# Patient Record
Sex: MALE | Race: WHITE | NOT HISPANIC OR LATINO | Employment: FULL TIME | URBAN - METROPOLITAN AREA
[De-identification: names, ages, dates, MRNs, and addresses within clinical notes are randomized per-mention and may not be internally consistent; named-entity substitution may affect disease eponyms.]

---

## 2022-05-30 ENCOUNTER — OFFICE VISIT (OUTPATIENT)
Dept: URGENT CARE | Facility: CLINIC | Age: 32
End: 2022-05-30
Payer: COMMERCIAL

## 2022-05-30 VITALS
WEIGHT: 200 LBS | SYSTOLIC BLOOD PRESSURE: 120 MMHG | RESPIRATION RATE: 18 BRPM | OXYGEN SATURATION: 98 % | DIASTOLIC BLOOD PRESSURE: 75 MMHG | HEART RATE: 75 BPM | HEIGHT: 66 IN | BODY MASS INDEX: 32.14 KG/M2 | TEMPERATURE: 98 F

## 2022-05-30 DIAGNOSIS — R05.9 COUGH: ICD-10-CM

## 2022-05-30 DIAGNOSIS — J06.9 VIRAL URI: ICD-10-CM

## 2022-05-30 DIAGNOSIS — H10.9 BACTERIAL CONJUNCTIVITIS OF BOTH EYES: Primary | ICD-10-CM

## 2022-05-30 DIAGNOSIS — J30.2 SEASONAL ALLERGIES: ICD-10-CM

## 2022-05-30 DIAGNOSIS — B96.89 BACTERIAL CONJUNCTIVITIS OF BOTH EYES: Primary | ICD-10-CM

## 2022-05-30 LAB
CTP QC/QA: YES
SARS-COV-2 RDRP RESP QL NAA+PROBE: NEGATIVE

## 2022-05-30 PROCEDURE — 3074F PR MOST RECENT SYSTOLIC BLOOD PRESSURE < 130 MM HG: ICD-10-PCS | Mod: CPTII,S$GLB,, | Performed by: NURSE PRACTITIONER

## 2022-05-30 PROCEDURE — 1160F RVW MEDS BY RX/DR IN RCRD: CPT | Mod: CPTII,S$GLB,, | Performed by: NURSE PRACTITIONER

## 2022-05-30 PROCEDURE — 3008F PR BODY MASS INDEX (BMI) DOCUMENTED: ICD-10-PCS | Mod: CPTII,S$GLB,, | Performed by: NURSE PRACTITIONER

## 2022-05-30 PROCEDURE — 1160F PR REVIEW ALL MEDS BY PRESCRIBER/CLIN PHARMACIST DOCUMENTED: ICD-10-PCS | Mod: CPTII,S$GLB,, | Performed by: NURSE PRACTITIONER

## 2022-05-30 PROCEDURE — U0002 COVID-19 LAB TEST NON-CDC: HCPCS | Mod: QW,S$GLB,, | Performed by: NURSE PRACTITIONER

## 2022-05-30 PROCEDURE — 1159F PR MEDICATION LIST DOCUMENTED IN MEDICAL RECORD: ICD-10-PCS | Mod: CPTII,S$GLB,, | Performed by: NURSE PRACTITIONER

## 2022-05-30 PROCEDURE — 99203 PR OFFICE/OUTPT VISIT, NEW, LEVL III, 30-44 MIN: ICD-10-PCS | Mod: S$GLB,,, | Performed by: NURSE PRACTITIONER

## 2022-05-30 PROCEDURE — 3078F DIAST BP <80 MM HG: CPT | Mod: CPTII,S$GLB,, | Performed by: NURSE PRACTITIONER

## 2022-05-30 PROCEDURE — 3008F BODY MASS INDEX DOCD: CPT | Mod: CPTII,S$GLB,, | Performed by: NURSE PRACTITIONER

## 2022-05-30 PROCEDURE — U0002: ICD-10-PCS | Mod: QW,S$GLB,, | Performed by: NURSE PRACTITIONER

## 2022-05-30 PROCEDURE — 3074F SYST BP LT 130 MM HG: CPT | Mod: CPTII,S$GLB,, | Performed by: NURSE PRACTITIONER

## 2022-05-30 PROCEDURE — 1159F MED LIST DOCD IN RCRD: CPT | Mod: CPTII,S$GLB,, | Performed by: NURSE PRACTITIONER

## 2022-05-30 PROCEDURE — 99203 OFFICE O/P NEW LOW 30 MIN: CPT | Mod: S$GLB,,, | Performed by: NURSE PRACTITIONER

## 2022-05-30 PROCEDURE — 3078F PR MOST RECENT DIASTOLIC BLOOD PRESSURE < 80 MM HG: ICD-10-PCS | Mod: CPTII,S$GLB,, | Performed by: NURSE PRACTITIONER

## 2022-05-30 RX ORDER — AZELASTINE 1 MG/ML
1 SPRAY, METERED NASAL 2 TIMES DAILY PRN
Qty: 30 ML | Refills: 0 | Status: SHIPPED | OUTPATIENT
Start: 2022-05-30

## 2022-05-30 RX ORDER — OFLOXACIN 3 MG/ML
2 SOLUTION/ DROPS OPHTHALMIC 4 TIMES DAILY
Qty: 5 ML | Refills: 0 | Status: SHIPPED | OUTPATIENT
Start: 2022-05-30 | End: 2022-06-06

## 2022-05-30 NOTE — PROGRESS NOTES
"Subjective:       Patient ID: Gualberto Kuhn Jr. is a 31 y.o. male.    Vitals:  height is 5' 6" (1.676 m) and weight is 90.7 kg (200 lb). His temperature is 97.7 °F (36.5 °C). His blood pressure is 120/75 and his pulse is 75. His respiration is 18 and oxygen saturation is 98%.     Chief Complaint: Facial Swelling    Pt is coming in with congestion and swollen eyes. Pt says her have been having the congestion for the pass few days and the eye swelling started last night. Pt says he suffers with seasonal allergies. Pt says his eye normally get red but this time they got puffy.Pt says his left eye is more red and blurry than the right.  Pt says he also have a slight cough only when he feels the post nasal drip. Pt did take any medication to help.     Provider note begins below:  31-year-old male is here today with complaints of nasal congestion/PND/pressure and cough for the past 3 days.  Patient also reports bilateral eye irritation, redness, blurry from tearing and yellow discharge that started yesterday.  Patient denies fever, chills, chest pain, shortness of breath, vomiting, diarrhea, abdominal pain, eye ball pain, eye injury, FB sensation or vision loss/changes.  Patient reports a history of seasonal allergies.  No OTC medications have been taking so far. Afebrile.  Patient is not COVID vaccinated.    Sinus Problem  This is a new problem. The current episode started in the past 7 days. There has been no fever. His pain is at a severity of 0/10. He is experiencing no pain. Associated symptoms include congestion, coughing and sinus pressure. Pertinent negatives include no chills, ear pain, headaches, neck pain, shortness of breath, sneezing or sore throat. Past treatments include nothing. The treatment provided no relief.       Constitution: Negative. Negative for chills, fatigue and fever.   HENT: Positive for congestion, postnasal drip and sinus pressure. Negative for ear pain, ear discharge, facial swelling and " sore throat.    Neck: Negative for neck pain, neck stiffness and painful lymph nodes.   Cardiovascular: Negative.  Negative for chest pain and sob on exertion.   Eyes: Negative.  Positive for eyelid swelling. Negative for eye itching, eye pain and eye redness.   Respiratory: Positive for cough. Negative for chest tightness, shortness of breath, wheezing and asthma.    Gastrointestinal: Negative.  Negative for abdominal pain, nausea and vomiting.   Endocrine: negative. excessive thirst.   Genitourinary: Negative.  Negative for dysuria, frequency, urgency and flank pain.   Musculoskeletal: Negative.  Negative for pain, trauma, joint pain and joint swelling.   Skin: Negative.  Negative for rash, wound, lesion and hives.   Allergic/Immunologic: Negative.  Positive for seasonal allergies. Negative for eczema, asthma, hives, itching and sneezing.   Neurological: Negative.  Negative for dizziness, passing out, headaches, disorientation and altered mental status.   Hematologic/Lymphatic: Negative.  Negative for swollen lymph nodes.   Psychiatric/Behavioral: Negative.  Negative for altered mental status, disorientation and confusion.       Objective:      Physical Exam   Constitutional: He is oriented to person, place, and time. He appears well-developed. He is cooperative.  Non-toxic appearance. He does not appear ill. No distress.   HENT:   Head: Normocephalic and atraumatic.   Ears:   Right Ear: Hearing, tympanic membrane, external ear and ear canal normal. impacted cerumen  Left Ear: Hearing, tympanic membrane, external ear and ear canal normal. impacted cerumen  Nose: Rhinorrhea and congestion present. No mucosal edema or nasal deformity. No epistaxis. Right sinus exhibits no maxillary sinus tenderness and no frontal sinus tenderness. Left sinus exhibits no maxillary sinus tenderness and no frontal sinus tenderness.   Mouth/Throat: Uvula is midline, oropharynx is clear and moist and mucous membranes are normal. No  trismus in the jaw. Normal dentition. No uvula swelling. No oropharyngeal exudate, posterior oropharyngeal edema or posterior oropharyngeal erythema.   Eyes: Lids are normal. Lids are everted and swept, no foreign bodies found. Right eye exhibits discharge. Right eye exhibits no chemosis, no exudate and no hordeolum. No foreign body present in the right eye. Left eye exhibits discharge. Left eye exhibits no chemosis, no exudate and no hordeolum. No foreign body present in the left eye. Right conjunctiva is not injected. Right conjunctiva has no hemorrhage. Left conjunctiva is injected. Left conjunctiva has no hemorrhage. No scleral icterus. Right eye exhibits normal extraocular motion and no nystagmus. Left eye exhibits normal extraocular motion and no nystagmus. vision grossly intact gaze aligned appropriately   Neck: Trachea normal and phonation normal. Neck supple. No edema present. No erythema present. No neck rigidity present.   Cardiovascular: Normal rate, regular rhythm, normal heart sounds and normal pulses.   Pulmonary/Chest: Effort normal and breath sounds normal. No stridor. No respiratory distress. He has no decreased breath sounds. He has no wheezes. He has no rhonchi. He has no rales. He exhibits no tenderness.   Abdominal: Normal appearance. Soft. flat abdomen There is no abdominal tenderness. There is no left CVA tenderness and no right CVA tenderness.   Musculoskeletal: Normal range of motion.         General: No deformity. Normal range of motion.   Lymphadenopathy:     He has no cervical adenopathy.   Neurological: no focal deficit. He is alert and oriented to person, place, and time. He exhibits normal muscle tone. Coordination normal.   Skin: Skin is warm, dry, intact, not diaphoretic and not pale.   Psychiatric: His speech is normal and behavior is normal. Mood, judgment and thought content normal.   Nursing note and vitals reviewed.    The following results have been reviewed with the  patient:  LABS-  Results for orders placed or performed in visit on 05/30/22   POCT COVID-19 Rapid Screening   Result Value Ref Range    POC Rapid COVID Negative Negative     Acceptable Yes        Hearing Screening    125Hz 250Hz 500Hz 1000Hz 2000Hz 3000Hz 4000Hz 6000Hz 8000Hz   Right ear:            Left ear:               Visual Acuity Screening    Right eye Left eye Both eyes   Without correction: 20/13 20/20 20/13   With correction:        IMAGING-  No results found.      Assessment:       1. Bacterial conjunctivitis of both eyes    2. Cough    3. Viral URI    4. Seasonal allergies          Plan:       FOLLOWUP  Follow up if symptoms worsen or fail to improve, for PLEASE CONTACT PCP OR CONTACT THE EMERGENCY ROOM..     PATIENT INSTRUCTIONS  Patient Instructions   INSTRUCTIONS:  - Rest.  - Drink plenty of fluids.  - Take Tylenol and/or Ibuprofen as directed as needed for fever/pain.  Do not take more than the recommended dose.  - follow up with your PCP within the next 1-2 weeks as needed.  - You must understand that you have received an Urgent Care treatment only and that you may be released before all of your medical problems are known or treated.   - You, the patient, will arrange for follow up care as instructed.   - If your condition worsens or fails to improve we recommend that you receive another evaluation at the ER immediately or contact your PCP to discuss your concerns.   - You can call [phone] or [phone] to help schedule an appointment with the appropriate provider.       OVER THE COUNTER RECOMMENDATIONS/SUGGESTIONS.     Make sure to stay well hydrated.     Use Nasal Saline to mechanically move any post nasal drip from your eustachian tube or from the back of your throat.     Use warm salt water gargles to ease your throat pain. Warm salt water gargles as needed for sore throat-  1/2 tsp salt to 1 cup warm water, gargle as desired.     Use an antihistamine such as Claritin,  Zyrtec or Allegra to dry you out.      Use pseudoephedrine (behind the counter) to decongest. Pseudoephedrine  30 mg up to 240 mg /day. It can raise your blood pressure and give you palpitations.     Use mucinex (guaifenisin) to break up mucous up to 2400mg/day to loosen any mucous.   The mucinex DM pill has a cough suppressant that can be sedating. It can be used at night to stop the tickle at the back of your throat.  You can use Mucinex D (it has guaifenesin and a high dose of pseudoephedrine) in the mornings to help decongest.        Use Afrin in each nare for no longer than 3 days, as it is addictive. It can also dry out your mucous membranes and cause elevated blood pressure. This is especially useful if you are flying.     Use Flonase 1-2 sprays/nostril per day. It is a local acting steroid nasal spray, if you develop a bloody nose, stop using the medication immediately.     Sometimes Nyquil at night is beneficial to help you get some rest, however it is sedating and it does have an antihistamine, and tylenol.     Honey is a natural cough suppressant that can be used.     Tylenol up to 4,000 mg a day is safe for short periods and can be used for body aches, pain, and fever. However in high doses and prolonged use it can cause liver irritation.     Ibuprofen is a non-steroidal anti-inflammatory that can be used for body aches, pain, and fever.However it can also cause stomach irritation if over used.           THANK YOU FOR ALLOWING ME TO PARTICIPATE IN YOUR HEALTHCARE,     Manuel Avalos NP   Bacterial conjunctivitis of both eyes  -     ofloxacin (OCUFLOX) 0.3 % ophthalmic solution; Place 2 drops into both eyes 4 (four) times daily. for 7 days  Dispense: 5 mL; Refill: 0    Cough  -     POCT COVID-19 Rapid Screening    Viral URI  -     azelastine (ASTELIN) 137 mcg (0.1 %) nasal spray; 1 spray (137 mcg total) by Nasal route 2 (two) times daily as needed for Rhinitis.  Dispense: 30 mL; Refill: 0    Seasonal  allergies

## 2022-05-30 NOTE — PATIENT INSTRUCTIONS
INSTRUCTIONS:  - Rest.  - Drink plenty of fluids.  - Take Tylenol and/or Ibuprofen as directed as needed for fever/pain.  Do not take more than the recommended dose.  - follow up with your PCP within the next 1-2 weeks as needed.  - You must understand that you have received an Urgent Care treatment only and that you may be released before all of your medical problems are known or treated.   - You, the patient, will arrange for follow up care as instructed.   - If your condition worsens or fails to improve we recommend that you receive another evaluation at the ER immediately or contact your PCP to discuss your concerns.   - You can call [phone] or [phone] to help schedule an appointment with the appropriate provider.       OVER THE COUNTER RECOMMENDATIONS/SUGGESTIONS.     Make sure to stay well hydrated.     Use Nasal Saline to mechanically move any post nasal drip from your eustachian tube or from the back of your throat.     Use warm salt water gargles to ease your throat pain. Warm salt water gargles as needed for sore throat-  1/2 tsp salt to 1 cup warm water, gargle as desired.     Use an antihistamine such as Claritin, Zyrtec or Allegra to dry you out.      Use pseudoephedrine (behind the counter) to decongest. Pseudoephedrine  30 mg up to 240 mg /day. It can raise your blood pressure and give you palpitations.     Use mucinex (guaifenisin) to break up mucous up to 2400mg/day to loosen any mucous.   The mucinex DM pill has a cough suppressant that can be sedating. It can be used at night to stop the tickle at the back of your throat.  You can use Mucinex D (it has guaifenesin and a high dose of pseudoephedrine) in the mornings to help decongest.        Use Afrin in each nare for no longer than 3 days, as it is addictive. It can also dry out your mucous membranes and cause elevated blood pressure. This is especially useful if you are flying.     Use Flonase 1-2 sprays/nostril per day. It is a  local acting steroid nasal spray, if you develop a bloody nose, stop using the medication immediately.     Sometimes Nyquil at night is beneficial to help you get some rest, however it is sedating and it does have an antihistamine, and tylenol.     Honey is a natural cough suppressant that can be used.     Tylenol up to 4,000 mg a day is safe for short periods and can be used for body aches, pain, and fever. However in high doses and prolonged use it can cause liver irritation.     Ibuprofen is a non-steroidal anti-inflammatory that can be used for body aches, pain, and fever.However it can also cause stomach irritation if over used.

## 2023-12-18 ENCOUNTER — OFFICE VISIT (OUTPATIENT)
Dept: PRIMARY CARE CLINIC | Facility: CLINIC | Age: 33
End: 2023-12-18
Payer: COMMERCIAL

## 2023-12-18 ENCOUNTER — LAB VISIT (OUTPATIENT)
Dept: LAB | Facility: HOSPITAL | Age: 33
End: 2023-12-18
Attending: INTERNAL MEDICINE
Payer: COMMERCIAL

## 2023-12-18 VITALS
BODY MASS INDEX: 36.35 KG/M2 | DIASTOLIC BLOOD PRESSURE: 88 MMHG | WEIGHT: 226.19 LBS | HEART RATE: 76 BPM | SYSTOLIC BLOOD PRESSURE: 110 MMHG | HEIGHT: 66 IN | OXYGEN SATURATION: 98 %

## 2023-12-18 DIAGNOSIS — Z00.00 ANNUAL PHYSICAL EXAM: ICD-10-CM

## 2023-12-18 DIAGNOSIS — R00.2 PALPITATIONS: ICD-10-CM

## 2023-12-18 DIAGNOSIS — Z00.00 ANNUAL PHYSICAL EXAM: Primary | ICD-10-CM

## 2023-12-18 DIAGNOSIS — Z23 NEED FOR VACCINATION: ICD-10-CM

## 2023-12-18 LAB
ALBUMIN SERPL BCP-MCNC: 4.5 G/DL (ref 3.5–5.2)
ALP SERPL-CCNC: 55 U/L (ref 55–135)
ALT SERPL W/O P-5'-P-CCNC: 46 U/L (ref 10–44)
ANION GAP SERPL CALC-SCNC: 11 MMOL/L (ref 8–16)
AST SERPL-CCNC: 28 U/L (ref 10–40)
BASOPHILS # BLD AUTO: 0.05 K/UL (ref 0–0.2)
BASOPHILS NFR BLD: 0.8 % (ref 0–1.9)
BILIRUB SERPL-MCNC: 1.3 MG/DL (ref 0.1–1)
BUN SERPL-MCNC: 16 MG/DL (ref 6–20)
CALCIUM SERPL-MCNC: 9.9 MG/DL (ref 8.7–10.5)
CHLORIDE SERPL-SCNC: 104 MMOL/L (ref 95–110)
CHOLEST SERPL-MCNC: 241 MG/DL (ref 120–199)
CHOLEST/HDLC SERPL: 5.7 {RATIO} (ref 2–5)
CO2 SERPL-SCNC: 23 MMOL/L (ref 23–29)
CREAT SERPL-MCNC: 0.7 MG/DL (ref 0.5–1.4)
DIFFERENTIAL METHOD: NORMAL
EOSINOPHIL # BLD AUTO: 0.2 K/UL (ref 0–0.5)
EOSINOPHIL NFR BLD: 2.7 % (ref 0–8)
ERYTHROCYTE [DISTWIDTH] IN BLOOD BY AUTOMATED COUNT: 12.2 % (ref 11.5–14.5)
EST. GFR  (NO RACE VARIABLE): >60 ML/MIN/1.73 M^2
GLUCOSE SERPL-MCNC: 84 MG/DL (ref 70–110)
HCT VFR BLD AUTO: 46.5 % (ref 40–54)
HDLC SERPL-MCNC: 42 MG/DL (ref 40–75)
HDLC SERPL: 17.4 % (ref 20–50)
HGB BLD-MCNC: 16.4 G/DL (ref 14–18)
IMM GRANULOCYTES # BLD AUTO: 0.03 K/UL (ref 0–0.04)
IMM GRANULOCYTES NFR BLD AUTO: 0.5 % (ref 0–0.5)
LDLC SERPL CALC-MCNC: 130.2 MG/DL (ref 63–159)
LYMPHOCYTES # BLD AUTO: 2 K/UL (ref 1–4.8)
LYMPHOCYTES NFR BLD: 31.3 % (ref 18–48)
MCH RBC QN AUTO: 29.5 PG (ref 27–31)
MCHC RBC AUTO-ENTMCNC: 35.3 G/DL (ref 32–36)
MCV RBC AUTO: 84 FL (ref 82–98)
MONOCYTES # BLD AUTO: 0.7 K/UL (ref 0.3–1)
MONOCYTES NFR BLD: 10.8 % (ref 4–15)
NEUTROPHILS # BLD AUTO: 3.5 K/UL (ref 1.8–7.7)
NEUTROPHILS NFR BLD: 53.9 % (ref 38–73)
NONHDLC SERPL-MCNC: 199 MG/DL
NRBC BLD-RTO: 0 /100 WBC
PLATELET # BLD AUTO: 207 K/UL (ref 150–450)
PMV BLD AUTO: 11.1 FL (ref 9.2–12.9)
POTASSIUM SERPL-SCNC: 4.5 MMOL/L (ref 3.5–5.1)
PROT SERPL-MCNC: 8 G/DL (ref 6–8.4)
RBC # BLD AUTO: 5.56 M/UL (ref 4.6–6.2)
SODIUM SERPL-SCNC: 138 MMOL/L (ref 136–145)
TRIGL SERPL-MCNC: 344 MG/DL (ref 30–150)
TSH SERPL DL<=0.005 MIU/L-ACNC: 1.57 UIU/ML (ref 0.4–4)
WBC # BLD AUTO: 6.39 K/UL (ref 3.9–12.7)

## 2023-12-18 PROCEDURE — 84443 ASSAY THYROID STIM HORMONE: CPT | Performed by: INTERNAL MEDICINE

## 2023-12-18 PROCEDURE — 1159F MED LIST DOCD IN RCRD: CPT | Mod: CPTII,S$GLB,, | Performed by: INTERNAL MEDICINE

## 2023-12-18 PROCEDURE — 1159F PR MEDICATION LIST DOCUMENTED IN MEDICAL RECORD: ICD-10-PCS | Mod: CPTII,S$GLB,, | Performed by: INTERNAL MEDICINE

## 2023-12-18 PROCEDURE — 99385 PR PREVENTIVE VISIT,NEW,18-39: ICD-10-PCS | Mod: 25,S$GLB,, | Performed by: INTERNAL MEDICINE

## 2023-12-18 PROCEDURE — 90715 TDAP VACCINE 7 YRS/> IM: CPT | Mod: S$GLB,,, | Performed by: INTERNAL MEDICINE

## 2023-12-18 PROCEDURE — 99385 PREV VISIT NEW AGE 18-39: CPT | Mod: 25,S$GLB,, | Performed by: INTERNAL MEDICINE

## 2023-12-18 PROCEDURE — 3008F PR BODY MASS INDEX (BMI) DOCUMENTED: ICD-10-PCS | Mod: CPTII,S$GLB,, | Performed by: INTERNAL MEDICINE

## 2023-12-18 PROCEDURE — 3008F BODY MASS INDEX DOCD: CPT | Mod: CPTII,S$GLB,, | Performed by: INTERNAL MEDICINE

## 2023-12-18 PROCEDURE — 99999 PR PBB SHADOW E&M-EST. PATIENT-LVL III: ICD-10-PCS | Mod: PBBFAC,,, | Performed by: INTERNAL MEDICINE

## 2023-12-18 PROCEDURE — 80061 LIPID PANEL: CPT | Performed by: INTERNAL MEDICINE

## 2023-12-18 PROCEDURE — 90715 TDAP VACCINE GREATER THAN OR EQUAL TO 7YO IM: ICD-10-PCS | Mod: S$GLB,,, | Performed by: INTERNAL MEDICINE

## 2023-12-18 PROCEDURE — 85025 COMPLETE CBC W/AUTO DIFF WBC: CPT | Performed by: INTERNAL MEDICINE

## 2023-12-18 PROCEDURE — 36415 COLL VENOUS BLD VENIPUNCTURE: CPT | Performed by: INTERNAL MEDICINE

## 2023-12-18 PROCEDURE — 99999 PR PBB SHADOW E&M-EST. PATIENT-LVL III: CPT | Mod: PBBFAC,,, | Performed by: INTERNAL MEDICINE

## 2023-12-18 PROCEDURE — 3074F PR MOST RECENT SYSTOLIC BLOOD PRESSURE < 130 MM HG: ICD-10-PCS | Mod: CPTII,S$GLB,, | Performed by: INTERNAL MEDICINE

## 2023-12-18 PROCEDURE — 90471 TDAP VACCINE GREATER THAN OR EQUAL TO 7YO IM: ICD-10-PCS | Mod: S$GLB,,, | Performed by: INTERNAL MEDICINE

## 2023-12-18 PROCEDURE — 80053 COMPREHEN METABOLIC PANEL: CPT | Performed by: INTERNAL MEDICINE

## 2023-12-18 PROCEDURE — 3079F DIAST BP 80-89 MM HG: CPT | Mod: CPTII,S$GLB,, | Performed by: INTERNAL MEDICINE

## 2023-12-18 PROCEDURE — 90471 IMMUNIZATION ADMIN: CPT | Mod: S$GLB,,, | Performed by: INTERNAL MEDICINE

## 2023-12-18 PROCEDURE — 3074F SYST BP LT 130 MM HG: CPT | Mod: CPTII,S$GLB,, | Performed by: INTERNAL MEDICINE

## 2023-12-18 PROCEDURE — 3079F PR MOST RECENT DIASTOLIC BLOOD PRESSURE 80-89 MM HG: ICD-10-PCS | Mod: CPTII,S$GLB,, | Performed by: INTERNAL MEDICINE

## 2023-12-18 NOTE — PROGRESS NOTES
[facility] Note    Chief Complaint      Chief Complaint   Patient presents with    Establish Care       History of Present Illness      Gualberto Kuhn Jr. is a 32 y.o. male with chronic conditions of allergic rhinitis who presents today for: establish care and annual preventative visit.  Has been having palpitations.    Diet: Prepares own food occasionally.  Orders out a lot.  Drinks plenty water.  Exercise: Stays active during work day, walking and lifting heavy objects.  Hunts on the weekends.   Denies drinking and driving, drinking more than 4 drinks on occasion, drug use.     Flu shot discussed.  Td due.    Cscope and PSA due age 46.    Past Medical History:  History reviewed. No pertinent past medical history.    Past Surgical History:   has a past surgical history that includes Tonsillectomy and Knee arthroscopy.    Family History:  family history includes Hyperlipidemia in his father; No Known Problems in his mother.     Social History:  Social History     Tobacco Use    Smoking status: Never    Smokeless tobacco: Current   Substance Use Topics    Alcohol use: Yes       I personally reviewed all past medical, surgical, social and family history.    Review of Systems   Constitutional:  Negative for chills, fever and malaise/fatigue.   Respiratory:  Negative for shortness of breath.    Cardiovascular:  Negative for chest pain.   Gastrointestinal:  Negative for constipation, diarrhea, nausea and vomiting.   Skin:  Negative for rash.   Neurological:  Negative for weakness.   All other systems reviewed and are negative.       Medications:  Outpatient Encounter Medications as of 12/18/2023   Medication Sig Dispense Refill    azelastine (ASTELIN) 137 mcg (0.1 %) nasal spray 1 spray (137 mcg total) by Nasal route 2 (two) times daily as needed for Rhinitis. (Patient not taking: Reported on 12/18/2023) 30 mL 0     No facility-administered encounter medications on file as of 12/18/2023.  "      Allergies:  Review of patient's allergies indicates:  No Known Allergies    Health Maintenance:  Immunization History   Administered Date(s) Administered    DTaP 02/27/1992, 05/08/1992, 07/03/1992, 07/02/1993, 01/07/1997    HIB 02/27/1992, 05/08/1992, 07/03/1992, 07/02/1993    Hepatitis A, Pediatric/Adolescent, 2 Dose 03/06/2006, 09/06/2006    Hepatitis B 08/02/1995, 09/06/1995, 08/03/1996    IPV 02/27/1992, 05/08/1992, 07/02/1993, 01/07/1997    MMR 07/09/1993, 01/07/1997    Meningococcal Conjugate (MCV4P) 08/28/2007, 06/13/2011    Td (ADULT) 05/11/2004      Health Maintenance   Topic Date Due    Hepatitis C Screening  Never done    TETANUS VACCINE  05/11/2014    Lipid Panel  Completed        Physical Exam      Vital Signs  Pulse: 76  SpO2: 98 %  BP: 110/88  BP Location: Left arm  Patient Position: Sitting  Pain Score: 0-No pain  Height and Weight  Height: 5' 6" (167.6 cm)  Weight: 102.6 kg (226 lb 3.1 oz)  BSA (Calculated - sq m): 2.19 sq meters  BMI (Calculated): 36.5  Weight in (lb) to have BMI = 25: 154.6]    Physical Exam  Vitals reviewed.   Constitutional:       Appearance: He is well-developed.   HENT:      Head: Normocephalic and atraumatic.      Right Ear: External ear normal.      Left Ear: External ear normal.   Cardiovascular:      Rate and Rhythm: Normal rate and regular rhythm.      Heart sounds: Normal heart sounds. No murmur heard.  Pulmonary:      Effort: Pulmonary effort is normal.      Breath sounds: Normal breath sounds. No wheezing or rales.   Abdominal:      General: Bowel sounds are normal.      Palpations: Abdomen is soft.          Laboratory:  CBC:      CMP:        Invalid input(s): "CREATININ"  URINALYSIS:       LIPIDS:      TSH:      A1C:        Assessment/Plan     Gualberto Kuhn Jr. is a 32 y.o.male with:    1. Annual physical exam  - CBC Auto Differential; Future  - Comprehensive Metabolic Panel; Future  - Lipid Panel; Future  - TSH; Future  Discussed diet and exercise, vaccines and " cancer screening, risk factors.  Screening labs ordered.     2. Palpitations  - EKG 12-lead  EKG with normal rhythm, normal intervals, no ST segment changes or TWI.  One premature ventricular beat observed.  Counseled on reducing caffeine and stress levels.  3. Need for vaccination  - Tdap Vaccine       Chronic conditions status updated as per HPI.  Other than changes above, cont current medications and maintain follow up with specialists.  No follow-ups on file.    No future appointments.    Tylor Nogueira MD  [facility]

## 2024-10-02 ENCOUNTER — PATIENT MESSAGE (OUTPATIENT)
Dept: UROLOGY | Facility: CLINIC | Age: 34
End: 2024-10-02

## 2024-10-02 ENCOUNTER — LAB VISIT (OUTPATIENT)
Dept: LAB | Facility: HOSPITAL | Age: 34
End: 2024-10-02
Attending: UROLOGY
Payer: COMMERCIAL

## 2024-10-02 ENCOUNTER — TELEPHONE (OUTPATIENT)
Dept: UROLOGY | Facility: CLINIC | Age: 34
End: 2024-10-02

## 2024-10-02 ENCOUNTER — OFFICE VISIT (OUTPATIENT)
Dept: UROLOGY | Facility: CLINIC | Age: 34
End: 2024-10-02
Payer: COMMERCIAL

## 2024-10-02 VITALS
DIASTOLIC BLOOD PRESSURE: 84 MMHG | WEIGHT: 226.44 LBS | BODY MASS INDEX: 36.39 KG/M2 | SYSTOLIC BLOOD PRESSURE: 134 MMHG | HEART RATE: 75 BPM | HEIGHT: 66 IN

## 2024-10-02 DIAGNOSIS — E29.1 TESTICULAR FAILURE: Primary | ICD-10-CM

## 2024-10-02 DIAGNOSIS — E29.1 TESTICULAR FAILURE: ICD-10-CM

## 2024-10-02 LAB
ESTRADIOL SERPL-MCNC: 26 PG/ML (ref 11–44)
FSH SERPL-ACNC: 4.92 MIU/ML (ref 0.95–11.95)
LH SERPL-ACNC: 2.8 MIU/ML (ref 0.6–12.1)
PROLACTIN SERPL IA-MCNC: 10 NG/ML (ref 3.5–19.4)
TESTOST SERPL-MCNC: 243 NG/DL (ref 304–1227)

## 2024-10-02 PROCEDURE — 1159F MED LIST DOCD IN RCRD: CPT | Mod: CPTII,S$GLB,, | Performed by: UROLOGY

## 2024-10-02 PROCEDURE — 84146 ASSAY OF PROLACTIN: CPT | Performed by: UROLOGY

## 2024-10-02 PROCEDURE — 88230 TISSUE CULTURE LYMPHOCYTE: CPT | Performed by: UROLOGY

## 2024-10-02 PROCEDURE — 81403 MOPATH PROCEDURE LEVEL 4: CPT | Performed by: UROLOGY

## 2024-10-02 PROCEDURE — 3079F DIAST BP 80-89 MM HG: CPT | Mod: CPTII,S$GLB,, | Performed by: UROLOGY

## 2024-10-02 PROCEDURE — 3008F BODY MASS INDEX DOCD: CPT | Mod: CPTII,S$GLB,, | Performed by: UROLOGY

## 2024-10-02 PROCEDURE — 84403 ASSAY OF TOTAL TESTOSTERONE: CPT | Performed by: UROLOGY

## 2024-10-02 PROCEDURE — 99204 OFFICE O/P NEW MOD 45 MIN: CPT | Mod: S$GLB,,, | Performed by: UROLOGY

## 2024-10-02 PROCEDURE — G2211 COMPLEX E/M VISIT ADD ON: HCPCS | Mod: S$GLB,,, | Performed by: UROLOGY

## 2024-10-02 PROCEDURE — 1160F RVW MEDS BY RX/DR IN RCRD: CPT | Mod: CPTII,S$GLB,, | Performed by: UROLOGY

## 2024-10-02 PROCEDURE — 3075F SYST BP GE 130 - 139MM HG: CPT | Mod: CPTII,S$GLB,, | Performed by: UROLOGY

## 2024-10-02 PROCEDURE — 99999 PR PBB SHADOW E&M-EST. PATIENT-LVL III: CPT | Mod: PBBFAC,,, | Performed by: UROLOGY

## 2024-10-02 PROCEDURE — 83002 ASSAY OF GONADOTROPIN (LH): CPT | Performed by: UROLOGY

## 2024-10-02 PROCEDURE — 83001 ASSAY OF GONADOTROPIN (FSH): CPT | Performed by: UROLOGY

## 2024-10-02 PROCEDURE — 82670 ASSAY OF TOTAL ESTRADIOL: CPT | Performed by: UROLOGY

## 2024-10-02 PROCEDURE — 36415 COLL VENOUS BLD VENIPUNCTURE: CPT | Performed by: UROLOGY

## 2024-10-02 NOTE — PROGRESS NOTES
"Chief Complaint:  Infertility    HPI:    Mr. Kuhn is a 33 y.o.  male who has been  to his wife for the past 8 months. They have been trying to achieve a pregnancy for the past 6 months years but without success. Gualberto Kuhn Jr. 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 He denies a history of erectile dysfunction and ejaculatory problems.    SA 24--2.7 cc/0.2 million per cc/50%/1.5%  SA 34--1.2 cc/azoospermia    He has achieved 0 pregnancies in the past.    Cathleen Duggan is 27 years old. ( [birth year]) Her menses are regular. She has not undergone prior hysterosalpingogram. She has achieved 0 prior pregnancies.  She sees Dr. Ahn.    The couple has not undergone prior intrauterine insemination procedures.    The couple has not undergone prior in-vitro fertilization procedures.    Gualberto Kuhn Jr. denies a history of exposure to harmful chemicals, toxins, and radiation.    No history of recent fevers greater than 101.5 degrees Farenheit.    No history of recent exposure to "wet heat."    No history of urological trauma or testicular torsion.    No history of prostatitis, epididymitis, and orchitis.    No history of post-pubertal mumps.    There is no known family history of fertility problems.    REVIEW OF SYSTEMS:     He denies headache, blurred vision, fever, nausea, vomiting, chills, abdominal pain, chest pain, sore throat, bleeding per rectum, cough, SOB, recent loss of consciousness, recent mental status changes, seizures, dizziness, or upper or lower extremity weakness.    PHYSICAL EXAM:     The patient generally appears in good health, is appropriately interactive, and is in no apparent distress.     Eyes: anicteric sclerae, moist conjunctivae; no lid-lag; PERRLA     HENT: Atraumatic; oropharynx clear with moist mucous membranes and no mucosal ulcerations;normal hard and soft palate.  No evidence of lymphadenopathy.    Neck: " "Trachea midline.  No thyromegaly.    Skin: No lesions.    Mental: Cooperative with normal affect.  Is oriented to time, place, and person.    Neuro: Grossly intact.    Chest: Normal inspiratory effort.   No accessory muscles.  No audible wheezes.  Respirations symmetric on inspiration and expiration.    Heart: Regular rhythm.      Abdomen:  Soft, non-tender. No masses or organomegaly. Bladder is not palpable. No evidence of flank discomfort. No evidence of inguinal hernia.    Genitourinary: Penis is normal with no evidence of plaques or induration. Urethral meatus is normal. Scrotum is normal. Testes are descended bilaterally with no evidence of abnormal masses or tenderness. Epididymis, vas deferens, and cord structures are normal bilaterally.  Testicular volume is approximately 17 cc bilaterally.    Extremities: No cyanosis, clubbing, or edema.    IMPRESSION & PLAN:    Mr. Kuhn is a 33 y.o.  male who has been  to his wife for the past 8 months. They have been trying to achieve a pregnancy for the past 6 months years but without success. Gualebrto Kuhn Jr. 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 He denies a history of erectile dysfunction and ejaculatory problems.    SA 9/5/24--2.7 cc/0.2 million per cc/50%/1.5%  SA 9/26/34--1.2 cc/azoospermia    1.  FSH, LH, testosterone, prolactin, and estradiol serum levels today.  Will also draw karyotype and Y chromosome microdeletion.  2.  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 Discussed that his SA's are discordant.  Recommend Semen analysis x 1 more with cryopreservation if possible.  3.  Return to the clinic in 3 weeks to discuss test results and treatment plan.  4.  Recommend avoiding "wet heat."  5.  Recommend taking a multivitamin and 500 mg of vitamin c daily in addition to the multivitamin.  6.  Please send a copy of the note to Dr. Ahn.  " Thank you for the consultation.  7.  Discussed that he may need TESE.  8.  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     CC: Jimy

## 2024-10-02 NOTE — TELEPHONE ENCOUNTER
----- Message from Kathie sent at 10/2/2024 11:31 AM CDT -----  Regarding: Appt Access  Contact: pt [phone]  Type:  Patient Returning Call    Who Called:pt   Who Left Message for Patient:Matilde  Does the patient know what this is regarding?:Yes  Would the patient rather a call back or a response via [facility]? callback  Best Call Back Number:[phone]

## 2024-10-02 NOTE — LETTER
October 2, 2024        Katy Ahn MD  [address] [facility]  [facility]  [facility] [id number]             [facility] 4th Fl  [address]  [place] [address]  Phone: [phone]   Patient: Gualberto Kuhn Jr.   MR Number: [id number]   YOB: 1990   Date of Visit: 10/2/2024       Dear Dr. Ahn:    Thank you for referring Gualberto Kuhn to me for evaluation. Attached you will find relevant portions of my assessment and plan of care.    If you have questions, please do not hesitate to call me. I look forward to following Gualberto Kuhn along with you.    Sincerely,      Brett Vences MD            CC  No Recipients    Enclosure

## 2024-10-02 NOTE — TELEPHONE ENCOUNTER
Call was placed to patient no answer left VM. Will reach out through [employer].       Please notify T is low.  It should be repeated between 7-10 am

## 2024-10-03 ENCOUNTER — LAB VISIT (OUTPATIENT)
Dept: LAB | Facility: HOSPITAL | Age: 34
End: 2024-10-03
Attending: UROLOGY
Payer: COMMERCIAL

## 2024-10-03 DIAGNOSIS — E29.1 TESTICULAR FAILURE: ICD-10-CM

## 2024-10-03 LAB — TESTOST SERPL-MCNC: 241 NG/DL (ref 304–1227)

## 2024-10-03 PROCEDURE — 36415 COLL VENOUS BLD VENIPUNCTURE: CPT | Mod: PO | Performed by: UROLOGY

## 2024-10-03 PROCEDURE — 84403 ASSAY OF TOTAL TESTOSTERONE: CPT | Performed by: UROLOGY

## 2024-10-10 ENCOUNTER — PATIENT MESSAGE (OUTPATIENT)
Dept: LAB | Facility: HOSPITAL | Age: 34
End: 2024-10-10
Payer: COMMERCIAL

## 2024-10-10 LAB
GENETICIST REVIEW: NORMAL
SPECIMEN SOURCE: NORMAL
Y CHROM DEL BLD/T: NORMAL
Y MICRODELETION RELEASED BY: NORMAL
Y MICRODELETION RESULT SUMMARY: NEGATIVE
Y MICRODELETION SPECIMEN: NORMAL

## 2024-10-11 LAB
CHROM BANDING METHOD: NORMAL
CHROMOSOME ANALYSIS CONGENITAL ADDITIONAL INFORMATION: NORMAL
CHROMOSOME ANALYSIS CONGENITAL RELEASED BY: NORMAL
CHROMOSOME ANALYSIS CONGENITAL RESULT SUMMARY: NORMAL
CLINICAL CYTOGENETICIST REVIEW: NORMAL
KARYOTYP SPEC: NORMAL
REASON FOR REFERRAL, CHROMOSOME ANALYSIS CONGENITAL: NORMAL
REF LAB TEST METHOD: NORMAL
SPECIMEN SOURCE: NORMAL
SPECIMEN, CHROMOSOME ANALYSIS CONGENITAL: NORMAL

## 2024-10-23 ENCOUNTER — OFFICE VISIT (OUTPATIENT)
Dept: UROLOGY | Facility: CLINIC | Age: 34
End: 2024-10-23
Payer: COMMERCIAL

## 2024-10-23 VITALS
BODY MASS INDEX: 36.14 KG/M2 | DIASTOLIC BLOOD PRESSURE: 77 MMHG | HEIGHT: 66 IN | HEART RATE: 94 BPM | SYSTOLIC BLOOD PRESSURE: 136 MMHG | WEIGHT: 224.88 LBS

## 2024-10-23 DIAGNOSIS — E29.1 TESTICULAR FAILURE: Primary | ICD-10-CM

## 2024-10-23 PROCEDURE — 3075F SYST BP GE 130 - 139MM HG: CPT | Mod: CPTII,S$GLB,, | Performed by: UROLOGY

## 2024-10-23 PROCEDURE — 99214 OFFICE O/P EST MOD 30 MIN: CPT | Mod: S$GLB,,, | Performed by: UROLOGY

## 2024-10-23 PROCEDURE — 1159F MED LIST DOCD IN RCRD: CPT | Mod: CPTII,S$GLB,, | Performed by: UROLOGY

## 2024-10-23 PROCEDURE — 3008F BODY MASS INDEX DOCD: CPT | Mod: CPTII,S$GLB,, | Performed by: UROLOGY

## 2024-10-23 PROCEDURE — 1160F RVW MEDS BY RX/DR IN RCRD: CPT | Mod: CPTII,S$GLB,, | Performed by: UROLOGY

## 2024-10-23 PROCEDURE — 99999 PR PBB SHADOW E&M-EST. PATIENT-LVL III: CPT | Mod: PBBFAC,,, | Performed by: UROLOGY

## 2024-10-23 PROCEDURE — 3078F DIAST BP <80 MM HG: CPT | Mod: CPTII,S$GLB,, | Performed by: UROLOGY

## 2024-10-23 RX ORDER — CLOMIPHENE CITRATE 50 MG/1
TABLET ORAL
Qty: 15 TABLET | Refills: 5 | Status: SHIPPED | OUTPATIENT
Start: 2024-10-23

## 2024-10-23 NOTE — PROGRESS NOTES
"Chief Complaint:  Infertility    HPI:    Mr. Kuhn is a 33 y.o.  male who has been  to his wife for the past 9 months. They have been trying to achieve a pregnancy for the past 7 months years but without success. Gualberto Kuhn Jr. 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 He denies a history of erectile dysfunction and ejaculatory problems.    Karyotype and Y chromosome microdeletion are normal.  Lab Results   Component Value Date    TOTALTESTOST 241 (L) 10/03/2024    LABLH 2.8 10/02/2024    FSH 4.92 10/02/2024    ESTRADIOL 26 10/02/2024    PROLACTIN 10.0 10/02/2024        SA 24--2.7 cc/0.2 million per cc/50%/1.5%  SA 24--1.2 cc/azoospermia  SA 10/16/24--1.2 cc/0.3 million per cc/42%/1%    FOR REVIEW FROM PREVIOUS:    Mr. Kuhn is a 33 y.o.  male who has been  to his wife for the past 8 months. They have been trying to achieve a pregnancy for the past 6 months years but without success. Gualberto Kuhn Jr. 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 He denies a history of erectile dysfunction and ejaculatory problems.    SA 24--2.7 cc/0.2 million per cc/50%/1.5%  SA 34--1.2 cc/azoospermia    He has achieved 0 pregnancies in the past.    Cathleen Duggan is 27 years old. ( 97) Her menses are regular. She has not undergone prior hysterosalpingogram. She has achieved 0 prior pregnancies.  She sees Dr. Ahn.    The couple has not undergone prior intrauterine insemination procedures.    The couple has not undergone prior in-vitro fertilization procedures.    Gualberto Kuhn Jr. denies a history of exposure to harmful chemicals, toxins, and radiation.    No history of recent fevers greater than 101.5 degrees Farenheit.    No history of recent exposure to "wet heat."    No history of urological trauma or testicular torsion.    No history of prostatitis, " epididymitis, and orchitis.    No history of post-pubertal mumps.    There is no known family history of fertility problems.    REVIEW OF SYSTEMS:     He denies headache, blurred vision, fever, nausea, vomiting, chills, abdominal pain, chest pain, sore throat, bleeding per rectum, cough, SOB, recent loss of consciousness, recent mental status changes, seizures, dizziness, or upper or lower extremity weakness.    PHYSICAL EXAM:     The patient generally appears in good health, is appropriately interactive, and is in no apparent distress.     Eyes: anicteric sclerae, moist conjunctivae; no lid-lag; PERRLA     HENT: Atraumatic; oropharynx clear with moist mucous membranes and no mucosal ulcerations;normal hard and soft palate.  No evidence of lymphadenopathy.    Neck: Trachea midline.  No thyromegaly.    Skin: No lesions.    Mental: Cooperative with normal affect.  Is oriented to time, place, and person.    Neuro: Grossly intact.    Chest: Normal inspiratory effort.   No accessory muscles.  No audible wheezes.  Respirations symmetric on inspiration and expiration.    Heart: Regular rhythm.      Abdomen:  Soft, non-tender. No masses or organomegaly. Bladder is not palpable. No evidence of flank discomfort. No evidence of inguinal hernia.    Genitourinary: Penis is normal with no evidence of plaques or induration. Urethral meatus is normal. Scrotum is normal. Testes are descended bilaterally with no evidence of abnormal masses or tenderness. Epididymis, vas deferens, and cord structures are normal bilaterally.  Testicular volume is approximately 17 cc bilaterally.    Extremities: No cyanosis, clubbing, or edema.    IMPRESSION & PLAN:    Mr. Kuhn is a 33 y.o.  male who has been  to his wife for the past 9 months. They have been trying to achieve a pregnancy for the past 7 months years but without success. Gualberto Kuhn Jr. 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 He denies a history of erectile dysfunction and ejaculatory problems.    Karyotype and Y chromosome microdeletion are normal.  Lab Results   Component Value Date    TOTALTESTOST 241 (L) 10/03/2024    LABLH 2.8 10/02/2024    FSH 4.92 10/02/2024    ESTRADIOL 26 10/02/2024    PROLACTIN 10.0 10/02/2024        SA 9/5/24--2.7 cc/0.2 million per cc/50%/1.5%  SA 9/26/24--1.2 cc/azoospermia  SA 10/16/24--1.2 cc/0.3 million per cc/42%/1%    1.  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  He was able to cryopreserve.    2.  Discussed that his T is low.  Recommend clomid. Side effects discussed.  A new Rx was given.  Will check a T and estradiol in 1 month.  3.  RTC 3 months with a SA x 1.  4.  Recommend avoiding "wet heat."  5.  Recommend taking a multivitamin and 500 mg of vitamin c daily in addition to the multivitamin.  6.  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    CC: Jimy        "

## 2024-10-23 NOTE — LETTER
October 23, 2024        Katy Ahn MD  [address]  [facility]  [facility] [address]             [facility] 4th Fl  [address]  [place] [address]  Phone: [phone]   Patient: Gualberto Kuhn Jr.   MR Number: [id number]   YOB: 1990   Date of Visit: 10/23/2024       Dear Dr. Ahn:    Thank you for referring Gualberto Kuhn to me for evaluation. Attached you will find relevant portions of my assessment and plan of care.    If you have questions, please do not hesitate to call me. I look forward to following Gualberto Kuhn along with you.    Sincerely,      Brett Vences MD            CC  No Recipients    Enclosure

## 2024-11-22 ENCOUNTER — LAB VISIT (OUTPATIENT)
Dept: LAB | Facility: HOSPITAL | Age: 34
End: 2024-11-22
Attending: UROLOGY
Payer: COMMERCIAL

## 2024-11-22 DIAGNOSIS — E29.1 TESTICULAR FAILURE: ICD-10-CM

## 2024-11-22 LAB
ESTRADIOL SERPL-MCNC: 56 PG/ML (ref 11–44)
TESTOST SERPL-MCNC: 897 NG/DL (ref 304–1227)

## 2024-11-22 PROCEDURE — 82670 ASSAY OF TOTAL ESTRADIOL: CPT | Performed by: UROLOGY

## 2024-11-22 PROCEDURE — 84403 ASSAY OF TOTAL TESTOSTERONE: CPT | Performed by: UROLOGY

## 2024-11-22 PROCEDURE — 36415 COLL VENOUS BLD VENIPUNCTURE: CPT | Mod: PO | Performed by: UROLOGY

## 2024-12-03 ENCOUNTER — PATIENT MESSAGE (OUTPATIENT)
Dept: UROLOGY | Facility: CLINIC | Age: 34
End: 2024-12-03
Payer: COMMERCIAL

## 2025-01-13 RX ORDER — CLOMIPHENE CITRATE 50 MG/1
TABLET ORAL
Qty: 15 TABLET | Refills: 5 | OUTPATIENT
Start: 2025-01-13

## 2025-01-13 NOTE — TELEPHONE ENCOUNTER
Call placed to pt regarding upcoming appt with Dr. Vences. Pt states he has not yet completed his SA, will call the fertility office to get scheduled. Pt informed SA is needed before appt with Dr. Vences and for refill. Pt verbalized understanding.

## 2025-01-16 ENCOUNTER — TELEPHONE (OUTPATIENT)
Dept: UROLOGY | Facility: CLINIC | Age: 35
End: 2025-01-16
Payer: COMMERCIAL

## 2025-01-16 ENCOUNTER — PATIENT MESSAGE (OUTPATIENT)
Dept: UROLOGY | Facility: CLINIC | Age: 35
End: 2025-01-16
Payer: COMMERCIAL

## 2025-01-16 NOTE — TELEPHONE ENCOUNTER
Call placed to pt to f/u on SA scheduling with fertility center. Pt unable to be reached, will reach out via [facility].

## 2025-01-30 ENCOUNTER — TELEPHONE (OUTPATIENT)
Dept: UROLOGY | Facility: CLINIC | Age: 35
End: 2025-01-30
Payer: COMMERCIAL

## 2025-02-03 ENCOUNTER — OFFICE VISIT (OUTPATIENT)
Dept: UROLOGY | Facility: CLINIC | Age: 35
End: 2025-02-03
Payer: COMMERCIAL

## 2025-02-03 VITALS
WEIGHT: 229.75 LBS | BODY MASS INDEX: 37.08 KG/M2 | DIASTOLIC BLOOD PRESSURE: 86 MMHG | HEART RATE: 93 BPM | SYSTOLIC BLOOD PRESSURE: 144 MMHG

## 2025-02-03 DIAGNOSIS — E29.1 TESTICULAR FAILURE: Primary | ICD-10-CM

## 2025-02-03 PROCEDURE — 1160F RVW MEDS BY RX/DR IN RCRD: CPT | Mod: CPTII,S$GLB,, | Performed by: UROLOGY

## 2025-02-03 PROCEDURE — 1159F MED LIST DOCD IN RCRD: CPT | Mod: CPTII,S$GLB,, | Performed by: UROLOGY

## 2025-02-03 PROCEDURE — 3077F SYST BP >= 140 MM HG: CPT | Mod: CPTII,S$GLB,, | Performed by: UROLOGY

## 2025-02-03 PROCEDURE — 3079F DIAST BP 80-89 MM HG: CPT | Mod: CPTII,S$GLB,, | Performed by: UROLOGY

## 2025-02-03 PROCEDURE — 99214 OFFICE O/P EST MOD 30 MIN: CPT | Mod: S$GLB,,, | Performed by: UROLOGY

## 2025-02-03 PROCEDURE — 3008F BODY MASS INDEX DOCD: CPT | Mod: CPTII,S$GLB,, | Performed by: UROLOGY

## 2025-02-03 PROCEDURE — G2211 COMPLEX E/M VISIT ADD ON: HCPCS | Mod: S$GLB,,, | Performed by: UROLOGY

## 2025-02-03 PROCEDURE — 99999 PR PBB SHADOW E&M-EST. PATIENT-LVL III: CPT | Mod: PBBFAC,,, | Performed by: UROLOGY

## 2025-02-03 RX ORDER — CLOMIPHENE CITRATE 50 MG/1
TABLET ORAL
Qty: 15 TABLET | Refills: 5 | Status: SHIPPED | OUTPATIENT
Start: 2025-02-03

## 2025-02-03 NOTE — PROGRESS NOTES
Chief Complaint:  Infertility    HPI:    Mr. Kuhn is a 34 y.o.  male who has been  to his wife for the past 1 year months. They have been trying to achieve a pregnancy for the past 1 year but without success. Gualberto Kuhn Jr. 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 He denies a history of erectile dysfunction and ejaculatory problems.    He has semen cryopreserved.    He was started on clomid for a low T.  SA on clomid shows improved concentration.  He has more energy on clomid.    Karyotype and Y chromosome microdeletion are normal.  Lab Results   Component Value Date    TOTALTESTOST 897 2024    LABLH 2.8 10/02/2024    FSH 4.92 10/02/2024    ESTRADIOL 56 (H) 2024    PROLACTIN 10.0 10/02/2024        SA 24--2.7 cc/0.2 million per cc/50%/1.5%  SA 24--1.2 cc/azoospermia  SA 10/16/24--1.2 cc/0.3 million per cc/42%/1%  SA 25--1.7 cc/0.9 million per cc/11%2%    FOR REVIEW FROM PREVIOUS:  Mr. Kuhn is a 33 y.o.  male who has been  to his wife for the past 9 months. They have been trying to achieve a pregnancy for the past 7 months years but without success. Gualberto Kuhn Jr. 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 He denies a history of erectile dysfunction and ejaculatory problems.    Karyotype and Y chromosome microdeletion are normal.  Lab Results   Component Value Date    TOTALTESTOST 241 (L) 10/03/2024    LABLH 2.8 10/02/2024    FSH 4.92 10/02/2024    ESTRADIOL 26 10/02/2024    PROLACTIN 10.0 10/02/2024        SA 24--2.7 cc/0.2 million per cc/50%/1.5%  SA 24--1.2 cc/azoospermia  SA 10/16/24--1.2 cc/0.3 million per cc/42%/1%    He has achieved 0 pregnancies in the past.    Cathleen Duggan is 27 years old. ( 89) Her menses are regular. She has not undergone prior hysterosalpingogram. She has achieved 0 prior pregnancies.  She sees   "Jimy.    The couple has not undergone prior intrauterine insemination procedures.    The couple has not undergone prior in-vitro fertilization procedures.    Gualberto Kuhn Jr. denies a history of exposure to harmful chemicals, toxins, and radiation.    No history of recent fevers greater than 101.5 degrees Farenheit.    No history of recent exposure to "wet heat."    No history of urological trauma or testicular torsion.    No history of prostatitis, epididymitis, and orchitis.    No history of post-pubertal mumps.    There is no known family history of fertility problems.    REVIEW OF SYSTEMS:     He denies headache, blurred vision, fever, nausea, vomiting, chills, abdominal pain, chest pain, sore throat, bleeding per rectum, cough, SOB, recent loss of consciousness, recent mental status changes, seizures, dizziness, or upper or lower extremity weakness.    PHYSICAL EXAM:     The patient generally appears in good health, is appropriately interactive, and is in no apparent distress.     Eyes: anicteric sclerae, moist conjunctivae; no lid-lag; PERRLA     HENT: Atraumatic; oropharynx clear with moist mucous membranes and no mucosal ulcerations;normal hard and soft palate.  No evidence of lymphadenopathy.    Neck: Trachea midline.  No thyromegaly.    Skin: No lesions.    Mental: Cooperative with normal affect.  Is oriented to time, place, and person.    Neuro: Grossly intact.    Chest: Normal inspiratory effort.   No accessory muscles.  No audible wheezes.  Respirations symmetric on inspiration and expiration.    Heart: Regular rhythm.      Abdomen:  Soft, non-tender. No masses or organomegaly. Bladder is not palpable. No evidence of flank discomfort. No evidence of inguinal hernia.    Genitourinary: Penis is normal with no evidence of plaques or induration. Urethral meatus is normal. Scrotum is normal. Testes are descended bilaterally with no evidence of abnormal masses or tenderness. Epididymis, vas deferens, and " "cord structures are normal bilaterally.  Testicular volume is approximately 17 cc bilaterally.    Extremities: No cyanosis, clubbing, or edema.    IMPRESSION & PLAN:    Mr. Kuhn is a 34 y.o.  male who has been  to his wife for the past 1 year months. They have been trying to achieve a pregnancy for the past 1 year but without success. Gualberto Kuhn Jr. 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 He denies a history of erectile dysfunction and ejaculatory problems.    He has semen cryopreserved.    He was started on clomid for a low T.  SA on clomid shows improved concentration.    Karyotype and Y chromosome microdeletion are normal.  Lab Results   Component Value Date    TOTALTESTOST 897 11/22/2024    LABLH 2.8 10/02/2024    FSH 4.92 10/02/2024    ESTRADIOL 56 (H) 11/22/2024    PROLACTIN 10.0 10/02/2024        SA 9/5/24--2.7 cc/0.2 million per cc/50%/1.5%  SA 9/26/24--1.2 cc/azoospermia  SA 10/16/24--1.2 cc/0.3 million per cc/42%/1%  SA 1/29/25--1.7 cc/0.9 million per cc/11%2%    1.  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  He was able to cryopreserve.    2.  Discussed that his T is low.  Continue clomid.Refilled.  3.  Recommend avoiding "wet heat."  4.  Recommend taking a multivitamin and 500 mg of vitamin c daily in addition to the multivitamin.  5.  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  6.  From a male factor efforts with IVF are most appropriate.  7.  RTC 6 months if not pregnant by then.    CC: Jimy          "

## 2025-02-03 NOTE — LETTER
February 3, 2025        Katy Ahn MD  [address]  [facility]  [facility] [address]             [facility] - [facility] 4th Fl  [address]  [place] [address]  Phone: [phone]   Patient: Gualberto Kuhn Jr.   MR Number: [id number]   YOB: 1990   Date of Visit: 2/3/2025       Dear Dr. Ahn:    Thank you for referring Gualberto Kuhn to me for evaluation. Attached you will find relevant portions of my assessment and plan of care.    If you have questions, please do not hesitate to call me. I look forward to following Gualberto Kuhn along with you.    Sincerely,      Brett Vences MD            CC  No Recipients    Enclosure

## 2025-04-09 DIAGNOSIS — R11.0 NAUSEA: Primary | ICD-10-CM

## 2025-04-09 RX ORDER — SCOPOLAMINE 1 MG/3D
1 PATCH, EXTENDED RELEASE TRANSDERMAL
Qty: 4 PATCH | Refills: 0 | Status: SHIPPED | OUTPATIENT
Start: 2025-04-09 | End: 2025-04-19

## 2025-06-04 ENCOUNTER — OFFICE VISIT (OUTPATIENT)
Dept: PRIMARY CARE CLINIC | Facility: CLINIC | Age: 35
End: 2025-06-04
Payer: COMMERCIAL

## 2025-06-04 VITALS
BODY MASS INDEX: 34.44 KG/M2 | HEIGHT: 66 IN | HEART RATE: 68 BPM | WEIGHT: 214.31 LBS | DIASTOLIC BLOOD PRESSURE: 82 MMHG | SYSTOLIC BLOOD PRESSURE: 110 MMHG | OXYGEN SATURATION: 98 %

## 2025-06-04 DIAGNOSIS — R40.0 DAYTIME SOMNOLENCE: ICD-10-CM

## 2025-06-04 DIAGNOSIS — R06.83 SNORING: ICD-10-CM

## 2025-06-04 DIAGNOSIS — E29.1 TESTICULAR FAILURE: ICD-10-CM

## 2025-06-04 DIAGNOSIS — Z00.00 ANNUAL PHYSICAL EXAM: Primary | ICD-10-CM

## 2025-06-04 PROCEDURE — 99395 PREV VISIT EST AGE 18-39: CPT | Mod: S$GLB,,, | Performed by: INTERNAL MEDICINE

## 2025-06-04 PROCEDURE — 99999 PR PBB SHADOW E&M-EST. PATIENT-LVL III: CPT | Mod: PBBFAC,,, | Performed by: INTERNAL MEDICINE

## 2025-06-04 PROCEDURE — 1159F MED LIST DOCD IN RCRD: CPT | Mod: CPTII,S$GLB,, | Performed by: INTERNAL MEDICINE

## 2025-06-04 PROCEDURE — 3074F SYST BP LT 130 MM HG: CPT | Mod: CPTII,S$GLB,, | Performed by: INTERNAL MEDICINE

## 2025-06-04 PROCEDURE — 3079F DIAST BP 80-89 MM HG: CPT | Mod: CPTII,S$GLB,, | Performed by: INTERNAL MEDICINE

## 2025-06-04 PROCEDURE — 3008F BODY MASS INDEX DOCD: CPT | Mod: CPTII,S$GLB,, | Performed by: INTERNAL MEDICINE
